# Patient Record
Sex: FEMALE | Race: WHITE | URBAN - METROPOLITAN AREA
[De-identification: names, ages, dates, MRNs, and addresses within clinical notes are randomized per-mention and may not be internally consistent; named-entity substitution may affect disease eponyms.]

---

## 2022-03-28 ENCOUNTER — OFFICE VISIT (OUTPATIENT)
Dept: URBAN - METROPOLITAN AREA CLINIC 24 | Facility: CLINIC | Age: 67
End: 2022-03-28
Payer: MEDICARE

## 2022-03-28 DIAGNOSIS — H10.522 ANGULAR BLEPHAROCONJUNCTIVITIS, LEFT EYE: Primary | ICD-10-CM

## 2022-03-28 DIAGNOSIS — H25.813 COMBINED FORMS OF AGE-RELATED CATARACT, BILATERAL: ICD-10-CM

## 2022-03-28 PROCEDURE — 99204 OFFICE O/P NEW MOD 45 MIN: CPT | Performed by: OPTOMETRIST

## 2022-03-28 RX ORDER — NEOMYCIN SULFATE, POLYMYXIN B SULFATE AND DEXAMETHASONE 3.5; 10000; 1 MG/ML; [USP'U]/ML; MG/ML
SUSPENSION OPHTHALMIC
Qty: 5 | Refills: 1 | Status: ACTIVE
Start: 2022-03-28

## 2022-03-28 ASSESSMENT — INTRAOCULAR PRESSURE
OD: 11
OS: 12

## 2022-03-28 NOTE — IMPRESSION/PLAN
Impression: Angular blepharoconjunctivitis, left eye: H10.522. Plan: Pt edu. Recommend start lid hygiene bid ou (sterilid, ocusoft info given) Add maxitrol gtts tid os (apply to lid bid) x 1 week Continue frequent afts. 
Notify clinic if symptoms persist.

## 2022-04-12 ENCOUNTER — OFFICE VISIT (OUTPATIENT)
Dept: URBAN - METROPOLITAN AREA CLINIC 24 | Facility: CLINIC | Age: 67
End: 2022-04-12
Payer: MEDICARE

## 2022-04-12 DIAGNOSIS — H10.522 ANGULAR BLEPHAROCONJUNCTIVITIS, LEFT EYE: ICD-10-CM

## 2022-04-12 DIAGNOSIS — H25.813 COMBINED FORMS OF AGE-RELATED CATARACT, BILATERAL: ICD-10-CM

## 2022-04-12 DIAGNOSIS — H35.342 MACULAR CYST, HOLE, OR PSEUDOHOLE, LEFT EYE: ICD-10-CM

## 2022-04-12 DIAGNOSIS — G45.3 AMAUROSIS FUGAX: Primary | ICD-10-CM

## 2022-04-12 PROCEDURE — 92134 CPTRZ OPH DX IMG PST SGM RTA: CPT | Performed by: OPTOMETRIST

## 2022-04-12 PROCEDURE — 99214 OFFICE O/P EST MOD 30 MIN: CPT | Performed by: OPTOMETRIST

## 2022-04-12 PROCEDURE — 92083 EXTENDED VISUAL FIELD XM: CPT | Performed by: OPTOMETRIST

## 2022-04-12 NOTE — IMPRESSION/PLAN
Impression: Macular cyst, hole, or pseudohole, left eye: H35.342. Plan: Pt edu. Mac oct demonstrated. Will arrange routine / baseline retina consult.

## 2022-04-12 NOTE — IMPRESSION/PLAN
Impression: Amaurosis fugax: G45.3.
-- pt c/o complete loss of right side vision x 2, lasting minutes < 1 week
-- known HTN
-- has ongoing cardiology care Dr. Apurva Arriaga, [address] [place] [address] Cardiology Plan: Pt edu / concerning symptom. Baseline HVF 30-2 today. Pt and I will communicate w/ cardiologist -- recommend urgent f/u [facility].

## 2022-04-12 NOTE — IMPRESSION/PLAN
Impression: Angular blepharoconjunctivitis, left eye: H10.522. Plan: Persists. Continue lid hygiene bid ou (sterilid, ocusoft info given) Use maxitrol gtts tid os (apply to lid bid) Increase afts from bid -- use at least qid, add lub gaye. 
Notify clinic if symptoms persist.